# Patient Record
Sex: MALE | Race: WHITE | Employment: UNEMPLOYED | URBAN - METROPOLITAN AREA
[De-identification: names, ages, dates, MRNs, and addresses within clinical notes are randomized per-mention and may not be internally consistent; named-entity substitution may affect disease eponyms.]

---

## 2022-01-11 ENCOUNTER — HOSPITAL ENCOUNTER (EMERGENCY)
Age: 37
Discharge: HOME | End: 2022-01-11
Attending: STUDENT IN AN ORGANIZED HEALTH CARE EDUCATION/TRAINING PROGRAM
Payer: MEDICAID

## 2022-01-11 VITALS
HEART RATE: 80 BPM | DIASTOLIC BLOOD PRESSURE: 87 MMHG | OXYGEN SATURATION: 98 % | TEMPERATURE: 98.2 F | SYSTOLIC BLOOD PRESSURE: 155 MMHG | BODY MASS INDEX: 33.74 KG/M2 | WEIGHT: 215 LBS | HEIGHT: 67 IN | RESPIRATION RATE: 16 BRPM

## 2022-01-11 DIAGNOSIS — Z20.822 LAB TEST NEGATIVE FOR COVID-19 VIRUS: Primary | ICD-10-CM

## 2022-01-11 LAB
SARS-COV-2, RAPID: NOT DETECTED
SPECIMEN DESCRIPTION: NORMAL

## 2022-01-11 PROCEDURE — 99282 EMERGENCY DEPT VISIT SF MDM: CPT

## 2022-01-11 PROCEDURE — 87635 SARS-COV-2 COVID-19 AMP PRB: CPT

## 2022-01-11 ASSESSMENT — ENCOUNTER SYMPTOMS
EYE PAIN: 0
COLOR CHANGE: 0
ABDOMINAL PAIN: 0
COUGH: 0
FACIAL SWELLING: 0
NAUSEA: 1
SHORTNESS OF BREATH: 0
EYE REDNESS: 0
RHINORRHEA: 0
BACK PAIN: 0
VOMITING: 0
DIARRHEA: 0
SORE THROAT: 0

## 2022-01-11 NOTE — ED TRIAGE NOTES
Mode of arrival (squad #, walk in, police, etc) : walk-in        Chief complaint(s): concern for covid-19        Arrival Note (brief scenario, treatment PTA, etc). : Patient reports being in contact with someone who lives with someone who has tested positive for covid-19. Patient reports nausea x1 day. C= \"Have you ever felt that you should Cut down on your drinking? \"  No  A= \"Have people Annoyed you by criticizing your drinking? \"  No  G= \"Have you ever felt bad or Guilty about your drinking? \"  No  E= \"Have you ever had a drink as an Eye-opener first thing in the morning to steady your nerves or to help a hangover? \"  No      Deferred []      Reason for deferring: N/A    *If yes to two or more: probable alcohol abuse. *

## 2022-01-11 NOTE — ED PROVIDER NOTES
EMERGENCY DEPARTMENT ENCOUNTER    Pt Name: Ignacio Bruce  MRN: [id number]  [place] 1985  Date of evaluation: 1/11/22  CHIEF COMPLAINT       Chief Complaint   Patient presents with    Concern For COVID-19     HISTORY OF PRESENT ILLNESS   HPI  42-year-old male previously healthy presents for evaluation requesting COVID test.  Patient states the his landlord tested positive for COVID he was exposed yesterday. He had some mild nausea this morning but no other acute symptoms. He is not vaccinated. No other associated exposures or symptoms. Symptoms are mild and intermittent. No home treatment prior to arrival.      REVIEW OF SYSTEMS     Review of Systems   Constitutional: Negative for chills and fatigue. HENT: Negative for facial swelling, nosebleeds, rhinorrhea and sore throat. Eyes: Negative for pain and redness. Respiratory: Negative for cough and shortness of breath. Cardiovascular: Negative for chest pain and leg swelling. Gastrointestinal: Positive for nausea. Negative for abdominal pain, diarrhea and vomiting. Genitourinary: Negative for flank pain and hematuria. Musculoskeletal: Negative for arthralgias and back pain. Skin: Negative for color change and rash. Neurological: Negative for dizziness, tremors, facial asymmetry, speech difficulty, weakness and numbness. PASTMEDICAL HISTORY   No past medical history on file. Past Problem List  There is no problem list on file for this patient. SURGICAL HISTORY     No past surgical history on file. CURRENT MEDICATIONS       There are no discharge medications for this patient. ALLERGIES     is allergic to penicillins and morphine. FAMILY HISTORY     has no family status information on file.       SOCIAL HISTORY       Social History     Tobacco Use    Smoking status: Not on file    Smokeless tobacco: Not on file   Substance Use Topics    Alcohol use: Not on file    Drug use: Not on file     PHYSICAL EXAM     INITIAL VITALS: BP (!) 155/87   Pulse 80   Temp 98.2 °F (36.8 °C) (Oral)   Resp 16   Ht 5' 7\" (1.702 m)   Wt 215 lb (97.5 kg)   SpO2 98%   BMI 33.67 kg/m²    Physical Exam  Vitals and nursing note reviewed. Constitutional:       Appearance: Normal appearance. HENT:      Head: Normocephalic and atraumatic. Nose: Nose normal.   Eyes:      Extraocular Movements: Extraocular movements intact. Pupils: Pupils are equal, round, and reactive to light. Cardiovascular:      Rate and Rhythm: Normal rate and regular rhythm. Pulmonary:      Effort: Pulmonary effort is normal. No respiratory distress. Breath sounds: Normal breath sounds. Abdominal:      General: Abdomen is flat. There is no distension. Palpations: Abdomen is soft. There is no mass. Musculoskeletal:         General: No swelling. Normal range of motion. Cervical back: Normal range of motion. No rigidity. Skin:     General: Skin is warm and dry. Neurological:      General: No focal deficit present. Mental Status: He is alert. Mental status is at baseline. Cranial Nerves: No cranial nerve deficit. MEDICAL DECISION MAKIN-year-old male presents for evaluation with COVID exposure and some mild nausea this morning. Patient is overall well-appearing with normal vital signs. No other symptoms. Will test for COVID. COVID is negative. Normal vital signs. No other complaints. Will discharge home. CRITICAL CARE:       PROCEDURES:    Procedures    DIAGNOSTIC RESULTS   EKG:All EKG's are interpreted by the Emergency Department Physician who either signs or Co-signs this chart in the absence of a cardiologist.        RADIOLOGY:All plain film, CT, MRI, and formal ultrasound images (except ED bedside ultrasound) are read by the radiologist, see reports below, unless otherwisenoted in MDM or here. No orders to display     LABS: All lab results were reviewed by myself, and all abnormals are listed below.   Labs Reviewed   COVID-19, RAPID       EMERGENCY DEPARTMENTCOURSE:         Vitals:    Vitals:    01/11/22 0938   BP: (!) 155/87   Pulse: 80   Resp: 16   Temp: 98.2 °F (36.8 °C)   TempSrc: Oral   SpO2: 98%   Weight: 215 lb (97.5 kg)   Height: 5' 7\" (1.702 m)       The patient was given the following medications while in the emergency department:  No orders of the defined types were placed in this encounter. CONSULTS:  None    FINAL IMPRESSION      1. Lab test negative for COVID-19 virus          DISPOSITION/PLAN   DISPOSITION Decision To Discharge 01/11/2022 11:24:37 AM      PATIENT REFERRED TO:  [facility]  [facility] [address]  [address] [facility] [id number] [phone]  Call   As needed    DISCHARGE MEDICATIONS:  There are no discharge medications for this patient. The care is provided during an unprecedented national emergency due to the novel coronavirus, COVID 19.   Reva Vasquez MD  Attending Emergency Physician                    Reva Vasquez MD  01/11/22 [phone]